# Patient Record
Sex: FEMALE | Race: WHITE | HISPANIC OR LATINO | Employment: FULL TIME | URBAN - METROPOLITAN AREA
[De-identification: names, ages, dates, MRNs, and addresses within clinical notes are randomized per-mention and may not be internally consistent; named-entity substitution may affect disease eponyms.]

---

## 2019-12-04 ENCOUNTER — HOSPITAL LABORATORY (OUTPATIENT)
Dept: OTHER | Facility: CLINIC | Age: 29
End: 2019-12-04

## 2019-12-05 ENCOUNTER — TELEPHONE (OUTPATIENT)
Dept: OTHER | Facility: CLINIC | Age: 29
End: 2019-12-05

## 2019-12-05 NOTE — TELEPHONE ENCOUNTER
Patient is scheduled with Dr. Gama for 12/6/19 at 12.  Yung has been informed that Dr. Gama will be coming from a surgery so the appointment time may be pushed back some.  She has the address, know about parking and walkway.     Fabby FRYE 12/5/19

## 2019-12-05 NOTE — TELEPHONE ENCOUNTER
Per Dr. Gama, he spoke with Dr. Olson regarding this patient and agreed to work this patient in on 12/6/19 at approximately noon.  No imaging needed per Dr. Gama.  Routing to scheduling to contact pt.  Please update pt Dr. Gama will be coming from surgery and time may need to change.    Zaida Hess, JEAN PAULN, RN  [facility] [place]

## 2019-12-06 ENCOUNTER — OFFICE VISIT (OUTPATIENT)
Dept: OTHER | Facility: CLINIC | Age: 29
End: 2019-12-06
Attending: SURGERY
Payer: COMMERCIAL

## 2019-12-06 VITALS — DIASTOLIC BLOOD PRESSURE: 72 MMHG | HEART RATE: 87 BPM | SYSTOLIC BLOOD PRESSURE: 117 MMHG

## 2019-12-06 DIAGNOSIS — L97.212 SKIN ULCER OF RIGHT CALF WITH FAT LAYER EXPOSED (H): Primary | ICD-10-CM

## 2019-12-06 PROCEDURE — G0463 HOSPITAL OUTPT CLINIC VISIT: HCPCS | Mod: 25

## 2019-12-06 PROCEDURE — 99203 OFFICE O/P NEW LOW 30 MIN: CPT | Mod: 25 | Performed by: SURGERY

## 2019-12-06 PROCEDURE — 11042 DBRDMT SUBQ TIS 1ST 20SQCM/<: CPT | Performed by: SURGERY

## 2019-12-06 NOTE — PROGRESS NOTES
[place] [facility]    Yung Dempsey was referred to see me today by Dr Long GROVES.  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  She required ORIF on 10/3/2019 by Dr. Olson.  There are incisions both on the medial lateral ankle and also over the distal right calf/ankle.  She had significant ecchymosis as documented in her photographs that she showed me today.  The other wounds healed fine and staples have all been removed.  Bone is doing very well and she is in a AFO off loading boot that they feel can be removed intermittently with full weightbearing.    Unfortunately, the site on the distal anterior calf/ankle was very ecchymotic she is developed a full-thickness ulceration.  Dry subcutaneous tissue is noted.  No evidence of significant undermining.  No evidence of infection.  She has some mild diffuse swelling for which she wears a Spandagrip.  Reports is somewhat more swollen today since she was working last evening.      Exam: Alert and appropriate.  Normal affect.             Blood pressure 117/72 right.  Pulse 87.             +3 palpable right dorsalis pedis pulse.              Posterior tibial pulses more difficult to palpate due to the swelling and                  surgical incision.  No evidence at all of ischemia to her foot.               The medial and lateral incisions have healed well.  Over the distal              Anterior calf/dorsal proximal ankle of the ulcerated area measures 4.5 x                       3.5 cm.         Procedure: Timeout was called and sites were identified.  Informed consent was obtained from the patient.  Area was prepped with ChloraPrep.  10 mL of 0.5% Marcaine were injected in a field block fashion with excellent results.  Using a #15 blade scalpel full-thickness/subcutaneous excisional debridement was performed on the entire wound.  I think excised all nonviable tissue.  Skin  edges were debrided to healthy bleeding tissue with no undermining.  Excised deeper tumor appeared to be the tissue is viable.  This is obviously reactive tissue with no granulation tissue noted.  The thrombosed of vein was excised.  I did not have to expose the underlying tendons which all appear to be intact.  Patient tolerated this procedure well.  We placed an Aquacel Ag moistened with saline over the wound followed by gauze and Kerlix roll and a Spandagrip.      Impression: Right distal calf/proximal anterior ankle ulceration following  ankle fracture.  Wound is been debrided today down to healthier tissue.  Still unclear whether that the wound needs much deeper debridement but clinically at this time it appears to be an adequate debridement without exposure of the tendons.  We will treat her initially with Aquacel Ag given her these dressings that she will moistened and changed daily for the next 2 days.  She may shower but should not soak her foot.  I think it is best that she keeps in the cam walker boot to decrease ankle motion.                She will return to see me in 3 days for reevaluation and possible debridement.  I discussed the options after this.  This has been ordered today to get approval from her insurance company I do feel that a wound VAC would be beneficial and with establishing good granulation tissue and healthy wound bed and hopefully prevent any further tissue loss.  But will take several days to be available.  Until that time she will use the Aquacel Ag that we have provided.  Once we do establish a good wound bed she will need to decide whether she wants to heal this by secondary intent or with a split-thickness skin graft.  This decision can be made at a later date.             We also did discuss the importance of nutrition for healing.  She will start taking ProteinPlus nutritional supplements that have 30 g of protein per container.  She needs to take of these 2 or 3 of these  daily to aid in healing.  Also multivitamin would be appropriate.              Fortunately she is young and healthy has excellent blood supply which should also aid in eventual good healing.       Jorge Gama MD     CC:  Dr Long Olson

## 2019-12-06 NOTE — NURSING NOTE
Yung Dempsey is a 29 year old female who presents for:  Chief Complaint   Patient presents with     Consult     New patient, wound on ankle. Referred by Dr. Olson. Per Natan no imaging needed.  Zaida/Natan ok'ed this day/time. Patient has been made aware that Natan will be in surgery, so time may be later.        Vitals:    Vitals:    12/06/19 1224 12/06/19 1225   BP: 111/75 117/72   BP Location: Left arm Right arm   Patient Position: Chair Chair   Cuff Size: Adult Regular Adult Regular   Pulse: 84 87       BMI:  There is no height or weight on file to calculate BMI.    Pain Score:  Data Unavailable        Maribel Rodriguez MA

## 2019-12-07 LAB
BACTERIA SPEC CULT: ABNORMAL
Lab: ABNORMAL
SPECIMEN SOURCE: ABNORMAL

## 2019-12-09 ENCOUNTER — TELEPHONE (OUTPATIENT)
Dept: OTHER | Facility: CLINIC | Age: 29
End: 2019-12-09

## 2019-12-09 ENCOUNTER — OFFICE VISIT (OUTPATIENT)
Dept: OTHER | Facility: CLINIC | Age: 29
End: 2019-12-09
Attending: SURGERY
Payer: COMMERCIAL

## 2019-12-09 VITALS — SYSTOLIC BLOOD PRESSURE: 116 MMHG | DIASTOLIC BLOOD PRESSURE: 80 MMHG | HEART RATE: 112 BPM

## 2019-12-09 DIAGNOSIS — L97.312: Primary | ICD-10-CM

## 2019-12-09 PROCEDURE — 99212 OFFICE O/P EST SF 10 MIN: CPT | Mod: 25 | Performed by: SURGERY

## 2019-12-09 PROCEDURE — G0463 HOSPITAL OUTPT CLINIC VISIT: HCPCS

## 2019-12-09 PROCEDURE — 11042 DBRDMT SUBQ TIS 1ST 20SQCM/<: CPT | Performed by: SURGERY

## 2019-12-09 NOTE — TELEPHONE ENCOUNTER
RN attempted to contact pt to discuss symptoms further. LVM requesting call back.  SAMAN Ibarra, RN  [facility] [place]    ADDENDUM:   Patient was evaluated by Dr. Gama in clinic on 12/9/19.

## 2019-12-09 NOTE — TELEPHONE ENCOUNTER
[facility]    Who is the name of the provider?:  Natan       What is the location you see this provider at?: [place]    Reason for call:  Has been ill since Saturday, leg is extremely painful, difficulty walking.  Should she keep the appt w Dr. Gama today at 4:45 pm    Can we leave a detailed message on this number?  YES

## 2019-12-09 NOTE — NURSING NOTE
Yung Dempsey is a 29 year old female who presents for:  Chief Complaint   Patient presents with     RECHECK     wound check, increased pain        Vitals:    Vitals:    12/09/19 1644   BP: 116/80   BP Location: Left arm   Patient Position: Chair   Cuff Size: Adult Regular   Pulse: 112       BMI:  There is no height or weight on file to calculate BMI.    Pain Score:  Data Unavailable        Gabbi Berger MA

## 2019-12-10 ENCOUNTER — TELEPHONE (OUTPATIENT)
Dept: OTHER | Facility: CLINIC | Age: 29
End: 2019-12-10

## 2019-12-10 NOTE — TELEPHONE ENCOUNTER
Pt called back. She reports her right ankle pain and swelling has significantly approved.  She was able to keep the leg elevated last night and did not go to work.  Pt plans to go to work tonight, she states she should be able to elevate her leg multiple times throughout the shift.  Wound vac is currently being ordered.  Will update Dr. Gama and determine when he would like pt to be seen again.  Zaida Hess, JEAN PAULN, RN  [facility] [place]

## 2019-12-10 NOTE — TELEPHONE ENCOUNTER
Contacted pt to follow up after 12/9/19 visit with Dr. Gama.  M requesting pt to call back at direct line, phone number provided.  Also provided clinic number to call back.  JEAN PAUL IbarraN, RN  [facility] [place]

## 2019-12-10 NOTE — PROGRESS NOTES
[place] [facility]    Yung Dempsey returns for follow-up.  She developed a full-thickness right dorsal ankle ulcer following ORIF.  We performed a debridement when she was in the office on 12/6/2019.  She has not been using Aquacel Ag with overlying gauze-Kerlix- Spandagrip.  She is still wearing her cast boot to limit her ankle activity.    She was up doing many activities over the weekend.  Noticed that their ankle is more swollen and sore last evening and again today.      Exam: Alert and appropriate.  Here with her daughter and father.              Cast boot was removed and dressings removed from the ankle               Ulcer measures 4 x 3 cm with a depth of 0.3 cm.                   Starting to see some granulation tissue on the lateral aspect.                   With the midportion to the medial aspect there is thick layer of slough and nonviable tissue.  Calf is swollen.  No erythema.      Procedure: Timeout was called and sites were identified.  Verbal consent from patient.  4% lidocaine applied with good results.  Using a #15 blade scalpel full-thickness/subcutaneous excisional debridement was performed.  On the lateral aspect remove the fibrin and slough with fairly good granulation tissue noted at the base.  On the more medial aspect we excised the nonviable tissue to the point where were starting to see viable tissue with adequate bleeding.  Tendons were not involved.  No joint is visualized.  No purulence at all.  No undermining.  Patient tolerated debridement well.  A moistened Aquacel Ag was placed over this followed by a gauze Stu and Ace wrap.  Placed back within her cast boot.    Impression: Some improvement of the ulcer.  No obvious infection.  Concerned why it is so sore but it may have been the swelling from her foot more dependent over the weekend.  She is scheduled to work at the [employer] this evening on the night shift.  I think with the present situation is best if she misses  today so she can go home and elevate her leg.  Obviously, we have the concerns of a deeper infection but I have no clinical findings that would indicate this.  We will thus continue with the Aquacel Ag, light wrap with Ace bandage, and leg elevation trying to limit her ambulation.  We will see her back in 2 to 3 days for reevaluation.  Likely will be a candidate for a wound VAC and we have applied for this.      Jorge Gama MD

## 2019-12-11 NOTE — TELEPHONE ENCOUNTER
[facility] wound vac rental authorization faxed to [phone] on 12/10/19, confirmed via RightFax at [phone].  Included patient demographics and recent progress notes.    Pt is scheduled to see Dr. Gama on 12/12/19 for application of wound vac.    Zaida Hess, JEAN PAULN, RN  [facility]

## 2019-12-11 NOTE — PROGRESS NOTES
[place] [facility]    Yung Dempsey returns for follow-up of her right distal anterior tibial/anterior ankle ulcer.  This occurred after ORIF of the significant right ankle fracture with subsequent good bony healing.  Debrided her callus last week and started Aquacel Ag dressing changes.  Saw in the office on 12/9/2019.  Wound was further debrided.  She had a lot of swelling and pain perhaps due to prolonged standing the day before with no evidence of deeper infection.  Has been changing the dressings daily and noticed improvement of the pain after missing her night shift at [employer] and elevating her leg.    Continues to do well.  Some mild soreness at the open ulcer with very minimal drainage using the Aquacel Ag.  She is using a leg scooter at work and still wearing the CAM Walker    Exam: Alert and appropriate.             Blood pressure 109/73.  Pulse 80             Removed Aquacel from right distal calf/ankle ulcer.              This measures 4 x 3 x 0.5 cm.                Approximately 30% with healthy granulation tissue.  Thicker layer of                      slough.  No undermining.  No erythema.  Deepest area has no exposed                Bone.    Procedure: Timeout was called and consents were signed.  4% topical lidocaine to the ulcer.  Using #15 blade scalpel full-thickness/subcutaneous excisional debridement was performed remove the slough and fibrin and debris the skin edges down to healthy tissue.  Crosshatching the deepest portion where we see the fascia type tissue.  Good bleeding was noted throughout.  Tolerated this well.          Sure prep was then not placed around the skin edges.  A grayish-black granular foam VAC sponge was applied to the ulcer and connected to the home VAC system 125 mmHg with a good seal.    Impression: Right distal calf/ankle ulcer following ankle fracture.  No evidence of deep tissue involvement.  Wound is been debrided today.  We have initiated the wound  VAC that she will leave in place.  We will see her again in 4 days for VAC change.       Jorge Gama MD

## 2019-12-12 ENCOUNTER — OFFICE VISIT (OUTPATIENT)
Dept: OTHER | Facility: CLINIC | Age: 29
End: 2019-12-12
Attending: SURGERY
Payer: COMMERCIAL

## 2019-12-12 VITALS — SYSTOLIC BLOOD PRESSURE: 109 MMHG | HEART RATE: 80 BPM | DIASTOLIC BLOOD PRESSURE: 73 MMHG

## 2019-12-12 DIAGNOSIS — L97.212 CALF ULCER, RIGHT, WITH FAT LAYER EXPOSED (H): Primary | ICD-10-CM

## 2019-12-12 PROCEDURE — G0463 HOSPITAL OUTPT CLINIC VISIT: HCPCS

## 2019-12-12 PROCEDURE — 11042 DBRDMT SUBQ TIS 1ST 20SQCM/<: CPT | Performed by: SURGERY

## 2019-12-12 PROCEDURE — 97605 NEG PRS WND THER DME<=50SQCM: CPT | Performed by: SURGERY

## 2019-12-12 RX ORDER — ACETAMINOPHEN 500 MG
TABLET ORAL
COMMUNITY

## 2019-12-12 RX ORDER — IBUPROFEN 200 MG
200 TABLET ORAL EVERY 4 HOURS PRN
COMMUNITY

## 2019-12-12 NOTE — NURSING NOTE
Yung Dempsey is a 29 year old female who presents for:  Chief Complaint   Patient presents with     RECHECK     apply wound vac right ankle; wound vac ordered per CHET on 12/10/19        Vitals:    Vitals:    12/12/19 1155   BP: 109/73   BP Location: Left arm   Patient Position: Chair   Cuff Size: Adult Regular   Pulse: 80       BMI:  There is no height or weight on file to calculate BMI.    Pain Score:  Data Unavailable        Maribel Rodriguez MA

## 2019-12-16 ENCOUNTER — HOSPITAL ENCOUNTER (OUTPATIENT)
Dept: WOUND CARE | Facility: CLINIC | Age: 29
Discharge: HOME | End: 2019-12-16
Attending: SURGERY | Admitting: SURGERY
Payer: COMMERCIAL

## 2019-12-16 VITALS
WEIGHT: 154.6 LBS | HEIGHT: 64 IN | TEMPERATURE: 97.3 F | DIASTOLIC BLOOD PRESSURE: 70 MMHG | HEART RATE: 73 BPM | RESPIRATION RATE: 16 BRPM | BODY MASS INDEX: 26.4 KG/M2 | SYSTOLIC BLOOD PRESSURE: 109 MMHG

## 2019-12-16 DIAGNOSIS — S81.001A OPEN WOUND OF KNEE, LEG, AND ANKLE, RIGHT, INITIAL ENCOUNTER: ICD-10-CM

## 2019-12-16 DIAGNOSIS — S91.001A ANKLE WOUND, RIGHT, INITIAL ENCOUNTER: ICD-10-CM

## 2019-12-16 DIAGNOSIS — S81.801A OPEN WOUND OF KNEE, LEG, AND ANKLE, RIGHT, INITIAL ENCOUNTER: ICD-10-CM

## 2019-12-16 DIAGNOSIS — S91.001A OPEN WOUND OF KNEE, LEG, AND ANKLE, RIGHT, INITIAL ENCOUNTER: ICD-10-CM

## 2019-12-16 PROCEDURE — 11042 DBRDMT SUBQ TIS 1ST 20SQCM/<: CPT | Performed by: SURGERY

## 2019-12-16 PROCEDURE — 11042 DBRDMT SUBQ TIS 1ST 20SQCM/<: CPT

## 2019-12-16 PROCEDURE — G0463 HOSPITAL OUTPT CLINIC VISIT: HCPCS | Mod: 25

## 2019-12-16 ASSESSMENT — MIFFLIN-ST. JEOR: SCORE: 1401.26

## 2019-12-16 NOTE — PROGRESS NOTES
[facility] [place] Progress Note    Subject: Yung Dempsey comes to see me at the wound clinic for ongoing treatment of her right distal anterior calf/ankle ulcer.  This was associated with her significant ankle fracture with wound breakdown.  I saw her at the vascular office which is documented in our EPIC notes.  Debridement was performed there was initially started on Aquasol AG.  We initiated a wound VAC on 12/12/2019.  This is been working well.    She is still offloading using a cam walker and a knee roller.  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    She has had some chronic swelling in her foot associated with the initial fracture and she is using an Ace bandage for this.    PMH: No past medical history on file.  There is no problem list on file for this patient.    Social Hx:   Social History     Socioeconomic History     Marital status: Single     Spouse name: Not on file     Number of children: Not on file     Years of education: Not on file     Highest education level: Not on file   Occupational History     Not on file   Social Needs     Financial resource strain: Not on file     Food insecurity:     Worry: Not on file     Inability: Not on file     Transportation needs:     Medical: Not on file     Non-medical: Not on file   Tobacco Use     Smoking status: Never Smoker     Smokeless tobacco: Never Used   Substance and Sexual Activity     Alcohol use: Yes     Drug use: Never     Sexual activity: Not on file   Lifestyle     Physical activity:     Days per week: Not on file     Minutes per session: Not on file     Stress: Not on file   Relationships     Social connections:     Talks on phone: Not on file     Gets together: Not on file     Attends [employer] service: Not on file     Active member of club or organization: Not on file     Attends meetings of clubs or organizations: Not on file     Relationship status: Not on file     Intimate partner violence:  "    Fear of current or ex partner: Not on file     Emotionally abused: Not on file     Physically abused: Not on file     Forced sexual activity: Not on file   Other Topics Concern     Not on file   Social History Narrative     Not on file       Surgical Hx: No past surgical history on file.    Allergies:  No Known Allergies    Medications:   Current Outpatient Medications   Medication     acetaminophen (TYLENOL) 500 MG tablet     ibuprofen (ADVIL/MOTRIN) 200 MG tablet     levonorgestrel (MIRENA) 20 MCG/24HR IUD     No current facility-administered medications for this encounter.        Labs: No results for input(s): ALBUMIN, HGB, INR, WBC, A1C, CRP in the last 82237 hours.    Invalid input(s): PREALBUMIN,  GLUCOSE, MICROBIO  No results found for: CR  No results found for: GFRESTIMATED  No results found for: GFRESTBLACK  No results found for: WBC  No results found for: CR     Nutrition requirements were discussed with patient today.  Objective:  /70 (BP Location: Right arm)   Pulse 73   Temp 97.3  F (36.3  C) (Temporal)   Resp 16   Ht 1.626 m (5' 4\")   Wt 70.1 kg (154 lb 9.6 oz)   BMI 26.54 kg/m    Wound (used by OP [facility] only) 12/16/19 1156 Right anterior ankle (Active)   Length (cm) 3 12/16/2019 11:00 AM   Width (cm) 4 12/16/2019 11:00 AM   Depth (cm) 0.3 12/16/2019 11:00 AM   Wound (cm^2) 12 cm^2 12/16/2019 11:00 AM   Wound Volume (cm^3) 3.6 cm^3 12/16/2019 11:00 AM   Dressing Appearance intact 12/16/2019 11:00 AM   Drainage Characteristics/Odor serosanguineous 12/16/2019 11:00 AM   Drainage Amount moderate 12/16/2019 11:00 AM        General:  Patient is alert and orientated, no acute distress.   Wound VAC was removed.  Mild ankle edema is noted.  No cellulitis.  Ulcerated area measures 3 x 4 x 0.3 cm.  70% of this is excellent granulation tissue with a mild amount of slough.  In the center of the wound that is deeper there is some nonviable tissue still present that I debrided with her last visit.  No " undermining.    Vascular: +3 palpable pedal pulses.    Procedure:   Patient was determined to be capable of making their own medical decisions and informed consent was obtained, topical anesthetic of 4% lidocaine was applied, debridement was performed.  All slough and fibrin removed.  Excised the area as described above down to healthy tissue.  Does not involve the deeper tissue.  No evidence of abscess or tunneling.  Good bleeding throughout.  Was used to debride ulcer down to and including subcutaneous tissue. Bleeding controlled with light pressure. Patient tolerated procedure well.    Impression: Traumatic right distal calf/ankle ulceration.  Wound is been debrided.  Overall improved wound with good granulation tissue except in the center area involves approximately 30%.  We will reapply the negative pressure dressing here in the clinic.  This will be changed twice weekly.  We will plan surgical debridement here in the clinic weekly by myself.    Plan:  We will dress the wounds with wound VAC at 125 mmHg.  Patient will return to the clinic in 1 weeks time to see me.  Later this week for VAC change.    Jorge Gama MD on 12/16/2019 at 12:10 PM

## 2019-12-16 NOTE — PROGRESS NOTES
Patient arrived for wound care visit. Certified Wound Care Nurse time spent evaluating patient record, completed a full evaluation and documented wound(s) & moe-wound skin; provided recommendation based on treatment plan. Applied dressing, reviewed discharge instructions, patient education, and discussed plan of care with appropriate medical team staff members and patient and/or family members.

## 2019-12-16 NOTE — DISCHARGE INSTRUCTIONS
[facility]  [address], [place] [address]  Appointment Phone [phone]    Yung Harmona      1990    Wound Dressing Change to right anterior ankle  Cleanse inside wound with saline or wound cleanser  Skin Care: Use Skin Prep to moe-wound skin  NPWT using black foam and pressure set to 125mmHg continuous suction.  Change dressing twice a week     Jorge Gama M.D. December 16, 2019    Call us at [phone] if you have any questions about your wounds, have redness or swelling around your wound, have a fever of 101 or greater or if you have any other problems or concerns. We answer the phone Monday through Friday 8 am to 4 pm, please leave a message as we check the voicemail frequently throughout the day.     Follow up with our office as scheduled

## 2019-12-20 ENCOUNTER — HOSPITAL ENCOUNTER (OUTPATIENT)
Dept: WOUND CARE | Facility: CLINIC | Age: 29
Discharge: HOME | End: 2019-12-20
Attending: SURGERY | Admitting: SURGERY
Payer: COMMERCIAL

## 2019-12-20 VITALS
TEMPERATURE: 98.1 F | DIASTOLIC BLOOD PRESSURE: 82 MMHG | SYSTOLIC BLOOD PRESSURE: 124 MMHG | HEART RATE: 81 BPM | RESPIRATION RATE: 16 BRPM

## 2019-12-20 DIAGNOSIS — S91.001A ANKLE WOUND, RIGHT, INITIAL ENCOUNTER: ICD-10-CM

## 2019-12-20 PROCEDURE — A6021 COLLAGEN DRESSING <=16 SQ IN: HCPCS

## 2019-12-20 PROCEDURE — A6454 SELF-ADHER BAND W>=3" <5"/YD: HCPCS

## 2019-12-20 PROCEDURE — A6441 PAD BAND W>=3" <5"/YD: HCPCS

## 2019-12-20 PROCEDURE — 97597 DBRDMT OPN WND 1ST 20 CM/<: CPT

## 2019-12-20 NOTE — PROGRESS NOTES
[facility] [place] Nurse Note    Subject: Yung Dempsey presents for nurse only visit for wound vac dressing change. Right Dorsal foot swollen and exposed tendon on medial aspect of wound.       Exam:  /82 (BP Location: Left arm)   Pulse 81   Temp 98.1  F (36.7  C) (Temporal)   Resp 16   Wound (used by OP [facility] only) 12/16/19 1156 Right anterior ankle surgical (Active)   Length (cm) 2.8 12/20/2019 11:50 AM   Width (cm) 4.4 12/20/2019 11:50 AM   Depth (cm) 0.7 12/20/2019 11:50 AM   Wound (cm^2) 12.32 cm^2 12/20/2019 11:50 AM   Wound Volume (cm^3) 8.62 cm^3 12/20/2019 11:50 AM   Wound healing % -2.67 12/20/2019 11:50 AM   Dressing Appearance intact 12/20/2019 11:50 AM   Drainage Characteristics/Odor serosanguineous 12/20/2019 11:50 AM   Drainage Amount moderate 12/20/2019 11:50 AM   Thickness/Stage full thickness 12/20/2019 11:50 AM   Base red/granulating;exposed;tendon 12/20/2019 11:50 AM   Red (%), Wound Tissue Color 70 12/20/2019 11:50 AM   Yellow (%), Wound Tissue Color 30 12/20/2019 11:50 AM   Periwound swelling 12/20/2019 11:50 AM   Periwound Temperature warm 12/20/2019 11:50 AM   Periwound Skin Turgor soft 12/20/2019 11:50 AM   Edges open 12/20/2019 11:50 AM   Care, Wound debrided 12/20/2019 11:50 AM     Procedure:  Time out was called, informed consent obtained, and topical anesthetic of 4% lidocaine was applied,selective debridement was performed using a curette to remove non-viable tissue less than 20 sq cm, no bleeding occurred. Patient tolerated procedure well.  Procedure:Wound was redressed with endoform NPWT @ 125mmhg with good seal and no leaks. Due to swelling 2 layer coflex wrap applied with zinc than CAM boot     Plan: Patient will return to the clinic in Monday  Meredith Sigala, JEAN PAULN, RN, CWOCN  December 20, 2019

## 2019-12-23 ENCOUNTER — HOSPITAL ENCOUNTER (OUTPATIENT)
Dept: WOUND CARE | Facility: CLINIC | Age: 29
Discharge: HOME | End: 2019-12-23
Attending: SURGERY | Admitting: SURGERY
Payer: COMMERCIAL

## 2019-12-23 VITALS
SYSTOLIC BLOOD PRESSURE: 112 MMHG | DIASTOLIC BLOOD PRESSURE: 71 MMHG | TEMPERATURE: 97.7 F | HEART RATE: 76 BPM | RESPIRATION RATE: 17 BRPM

## 2019-12-23 DIAGNOSIS — S91.001A ANKLE WOUND, RIGHT, INITIAL ENCOUNTER: ICD-10-CM

## 2019-12-23 PROCEDURE — A6441 PAD BAND W>=3" <5"/YD: HCPCS

## 2019-12-23 PROCEDURE — 11042 DBRDMT SUBQ TIS 1ST 20SQCM/<: CPT | Performed by: SURGERY

## 2019-12-23 PROCEDURE — A6454 SELF-ADHER BAND W>=3" <5"/YD: HCPCS

## 2019-12-23 PROCEDURE — 11042 DBRDMT SUBQ TIS 1ST 20SQCM/<: CPT

## 2019-12-23 NOTE — PROGRESS NOTES
[facility] Wound Healing [place] Progress Note    Subject: Yung Dempsey returns for wound debridement and VAC change.  She had an ulceration over her surgical site with the skin broke down after ankle fracture.  No obvious deep involvement.  However, there has been a deeper area that goes close to the fascia in the center of the wound that we have been concerned about.    Last visit they placed endoform over this area under the VAC.  Also dynamic compression wrap but due to the swelling which is worked out very well for her.  She has no specific weightbearing restrictions from her orthopedic standpoint.  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  She is also using her knee scooter to offload quite frequently.    Has been taking her nutritional supplements to aid in healing.  Very little discomfort from the site.  Very little discharge in the VAC containers.    Patient Active Problem List   Diagnosis     Ankle wound, right, initial encounter     No past medical history on file.  Exam:  /71 (BP Location: Right arm)   Pulse 76   Temp 97.7  F (36.5  C) (Temporal)   Resp 17   Wound (used by OP [facility] only) 12/16/19 1156 Right anterior ankle surgical (Active)   Length (cm) 2.7 12/23/2019 11:00 AM   Width (cm) 3.5 12/23/2019 11:00 AM   Depth (cm) 0.4 12/23/2019 11:00 AM   Wound (cm^2) 9.45 cm^2 12/23/2019 11:00 AM   Wound Volume (cm^3) 3.78 cm^3 12/23/2019 11:00 AM   Wound healing % 21.25 12/23/2019 11:00 AM   Dressing Appearance intact 12/23/2019 11:00 AM   Drainage Characteristics/Odor serosanguineous 12/23/2019 11:00 AM   Drainage Amount moderate 12/23/2019 11:00 AM   Thickness/Stage full thickness 12/23/2019 12:15 PM   Base red/granulating;slough 12/23/2019 12:15 PM   Periwound intact;edematous 12/23/2019 12:15 PM   Periwound Temperature warm 12/23/2019 12:15 PM   Care, Wound debrided 12/23/2019 12:15 PM     Alert and appropriate.  Very comfortable.  Dressings removed.   Swelling is better.  No erythema or cellulitis.  Ulcerated area measures 2.7 x 3.5 x 0.4 cm.  Worsening good granulation tissue on the borders.  Still some nonviable tissue at the center area as noted in the photograph where it is whitish in color.  This does not go deeper than the fascia.    Procedure:   Patient was determined to be capable of making their own medical decisions and informed consent was obtained. Topical anesthetic of 4% lidocaine was applied, debridement was performed using a #15 blade down to and including subcutaneous tissue.  Did excise extensively the center area down to healthy fascia.  All slough and fibrin removed on the edges where we do have good healthy granulation tissue.  No undermining.  Tolerated this with very minimal if any discomfort with the topical lidocaine.  Bleeding controlled with light pressure. Patient tolerated procedure well.    Impression: Overall improvement.  We will continue with wound VAC.  Hold the endoform.  We will cut a separate sponge to fill the deeper area to make sure we have excellent contact and thus used to sections of sponge.  Home VAC and 125 mmHg.  Dynamic compression wrap was helpful for the edema worked out very well and thus we will reapply this today also.    She does have a cam boot and should wear this at work but can use this less and less at home.    Plan: We will dress the wounds with wound VAC and dynamic compression wrap.  This will be changed 12/27/2019.  I will see her again next week for debridement..  Patient will return to the clinic in 1 weeks time    Jorge Gama MD on 12/23/2019 at 12:18 PM

## 2019-12-23 NOTE — DISCHARGE INSTRUCTIONS
[facility]  [address], [facility] [address]  Appointment Phone [phone]    Yung Harmona 1990    Wound Dressing Change to right anterior ankle  Cleanse inside wound with saline or wound cleanser  Skin Care: Use Skin Prep to moe-wound skin  NPWT using black foam (make sure to get into divit) and pressure set to 125mmHg continuous suction followed by a zinc colfex  Change dressing twice a week    Jorge Gama M.D. December 23, 2019    Call us at [phone] if you have any questions about your wounds, have redness or  swelling around your wound, have a fever of 101 or greater or if you have any other  problems or concerns. We answer the phone Monday through Friday 8 am to 4 pm,  please leave a message as we check the voicemail frequently throughout the day.    Follow up with our office as scheduled

## 2019-12-27 ENCOUNTER — HOSPITAL ENCOUNTER (OUTPATIENT)
Dept: WOUND CARE | Facility: CLINIC | Age: 29
Discharge: HOME | End: 2019-12-27
Attending: SURGERY | Admitting: SURGERY
Payer: COMMERCIAL

## 2019-12-27 DIAGNOSIS — S91.001A ANKLE WOUND, RIGHT, INITIAL ENCOUNTER: ICD-10-CM

## 2019-12-27 PROCEDURE — A6441 PAD BAND W>=3" <5"/YD: HCPCS

## 2019-12-27 PROCEDURE — 29581 APPL MULTLAYER CMPRN SYS LEG: CPT

## 2019-12-27 PROCEDURE — A6454 SELF-ADHER BAND W>=3" <5"/YD: HCPCS

## 2019-12-27 PROCEDURE — 97605 NEG PRS WND THER DME<=50SQCM: CPT

## 2019-12-27 NOTE — PROGRESS NOTES
[facility] Wound Healing [place] Nurse Note    Subject: Yung Dempsey presents for nurse only visit for wound vac dressing change. Her 2 layer wrap that was applied earlier this week got wet with a shower so she took it off and applied spandigrip and ace wrap      Exam:    Wound (used by OP [facility] only) 12/16/19 1156 Right anterior ankle surgical (Active)   Length (cm) 2 12/27/2019  8:17 AM   Width (cm) 3.5 12/27/2019  8:17 AM   Depth (cm) 0.6 12/27/2019  8:17 AM   Wound (cm^2) 7 cm^2 12/27/2019  8:17 AM   Wound Volume (cm^3) 4.2 cm^3 12/27/2019  8:17 AM   Wound healing % 41.67 12/27/2019  8:17 AM   Dressing Appearance moist drainage 12/27/2019  8:17 AM   Drainage Characteristics/Odor yellow 12/27/2019  8:17 AM   Drainage Amount moderate 12/27/2019  8:17 AM   Thickness/Stage full thickness 12/27/2019  8:17 AM   Base red/granulating 12/27/2019  8:17 AM   Red (%), Wound Tissue Color 100 12/27/2019  8:17 AM   Periwound intact 12/27/2019  8:17 AM   Periwound Temperature warm 12/27/2019  8:17 AM   Periwound Skin Turgor soft 12/27/2019  8:17 AM   Edges open 12/27/2019  8:17 AM   Care, Wound non-select wound debridement performed;negative pressure wound therapy 12/27/2019  8:17 AM     Procedure:  Topical anesthetic of 4% lidocaine was applied,non-selective debridement was performed, no bleeding occurred. Patient tolerated procedure well.  Procedure #2:  Wound was redressed with  NPWT @ 125mmhg with good seal and no leaks.  Procedure #3: Application of 2 layer coflex wrap was applied.  Plan: Patient will return to the clinic Monday.  Meredith Sigala, JEAN PAULN, RN, CWOCN  December 27, 2019

## 2019-12-30 ENCOUNTER — HOSPITAL ENCOUNTER (OUTPATIENT)
Dept: WOUND CARE | Facility: CLINIC | Age: 29
Discharge: HOME | End: 2019-12-30
Attending: SURGERY | Admitting: SURGERY
Payer: COMMERCIAL

## 2019-12-30 VITALS
TEMPERATURE: 97.8 F | RESPIRATION RATE: 16 BRPM | SYSTOLIC BLOOD PRESSURE: 123 MMHG | HEART RATE: 68 BPM | DIASTOLIC BLOOD PRESSURE: 68 MMHG

## 2019-12-30 DIAGNOSIS — S91.001A ANKLE WOUND, RIGHT, INITIAL ENCOUNTER: ICD-10-CM

## 2019-12-30 PROCEDURE — A6454 SELF-ADHER BAND W>=3" <5"/YD: HCPCS

## 2019-12-30 PROCEDURE — 11042 DBRDMT SUBQ TIS 1ST 20SQCM/<: CPT | Performed by: SURGERY

## 2019-12-30 PROCEDURE — 11042 DBRDMT SUBQ TIS 1ST 20SQCM/<: CPT

## 2019-12-30 PROCEDURE — A6441 PAD BAND W>=3" <5"/YD: HCPCS

## 2019-12-30 NOTE — PROGRESS NOTES
[facility] Wound Healing [place] Progress Note    Subject: Yung Dempsey returns for follow-up of her right anterior distal calf/ankle ulcer associated with her ORIF of her ankle fracture.  We have been debriding the ulcer on a weekly basis and using a negative pressure wound VAC.  Over the last 2 weeks dynamic compression wrap is been used to control the edema and this is been very helpful.    This is been working out very well for her.  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    Patient Active Problem List   Diagnosis     Ankle wound, right, initial encounter     No past medical history on file.  Exam:  /68 (BP Location: Left arm)   Pulse 68   Temp 97.8  F (36.6  C) (Temporal)   Resp 16   Wound (used by OP [facility] only) 12/16/19 1156 Right anterior ankle surgical (Active)   Length (cm) 2.6 12/30/2019 11:05 AM   Width (cm) 3.2 12/30/2019 11:05 AM   Depth (cm) 0.6 12/30/2019 11:05 AM   Wound (cm^2) 8.32 cm^2 12/30/2019 11:05 AM   Wound Volume (cm^3) 4.99 cm^3 12/30/2019 11:05 AM   Wound healing % 30.67 12/30/2019 11:05 AM   Dressing Appearance intact 12/30/2019 11:05 AM   Drainage Characteristics/Odor serosanguineous 12/30/2019 11:05 AM   Drainage Amount moderate 12/30/2019 11:05 AM     Alert and appropriate.  Here with her daughter.  Very comfortable.  VAC was removed.  Ulceration continues to improve.  The central area that was quite deep with poor granulation tissue was significantly better.  Mild amount of slough is noted and slightly thicker at this deeper segment in the center.  No undermining.  No cellulitis.  No edema.    Procedure:   Patient was determined to be capable of making their own medical decisions and informed consent was obtained. Topical anesthetic of 4% lidocaine was applied, debridement was performed using a #15 blade down to and including subcutaneous tissue.  All slough and fibrin removed.  Slightly deeper debridement in  the central area but healthy granulation tissue now fills the entire wound bed at with good vascularity.  Edges slightly debrided also on the skin.  On the edges the granulation tissue is flush with the epithelium.  Bleeding controlled with light pressure. Patient tolerated procedure well.    Impression: Continued improvement.  Will continue with the black granular foam sponge directly over the ulcer bed with a wound VAC in 125 mmHg.  Dynamic compression wrap was applied from the toes to the mid calf to help control the swelling which is quite effective.    Plan: We will dress the wounds with wound VAC and compression wrap.      Patient will return to the clinic this Friday for dressing change.  I will see her again in 1 week for reevaluation.  We will consider discontinuation of the VAC at that time depending the state of the ulceration and this is been discussed with the patient.    Post debridement photo below          Jorge Gama MD on 12/30/2019 at 11:22 AM

## 2019-12-30 NOTE — DISCHARGE INSTRUCTIONS
[facility] WOUND HEALING INSTITUTE  [address], [facility] [address]  Appointment Phone [phone]    Yung Harmona 1990    Wound Dressing Change to right anterior ankle  Cleanse inside wound with saline or wound cleanser  Skin Care: Use Skin Prep to moe-wound skin  NPWT using black foam (make sure to get into divit) and pressure set to 125mmHg  continuous suction followed by a zinc colfex  Change dressing twice a week    Jorge Gama M.D. December 30, 2019    Call us at [phone] if you have any questions about your wounds, have redness or  swelling around your wound, have a fever of 101 or greater or if you have any other  problems or concerns. We answer the phone Monday through Friday 8 am to 4 pm,  please leave a message as we check the voicemail frequently throughout the day.    Follow up with our office as scheduled

## 2020-01-03 ENCOUNTER — HOSPITAL ENCOUNTER (OUTPATIENT)
Dept: WOUND CARE | Facility: CLINIC | Age: 30
Discharge: HOME | End: 2020-01-03
Attending: SURGERY | Admitting: SURGERY
Payer: COMMERCIAL

## 2020-01-03 VITALS
TEMPERATURE: 97.7 F | DIASTOLIC BLOOD PRESSURE: 78 MMHG | RESPIRATION RATE: 18 BRPM | HEART RATE: 80 BPM | SYSTOLIC BLOOD PRESSURE: 116 MMHG

## 2020-01-03 DIAGNOSIS — S91.001A ANKLE WOUND, RIGHT, INITIAL ENCOUNTER: ICD-10-CM

## 2020-01-03 PROCEDURE — A6454 SELF-ADHER BAND W>=3" <5"/YD: HCPCS

## 2020-01-03 PROCEDURE — 97605 NEG PRS WND THER DME<=50SQCM: CPT

## 2020-01-03 PROCEDURE — A6441 PAD BAND W>=3" <5"/YD: HCPCS

## 2020-01-03 PROCEDURE — 97597 DBRDMT OPN WND 1ST 20 CM/<: CPT

## 2020-01-03 NOTE — PROGRESS NOTES
[facility] Wound Healing [place] Nurse Note    Subject: Yung Dempsey presents for nurse only visit for wound vac dressing change. To right anterior lower shin surgical site.     Exam:  /78 (BP Location: Left arm)   Pulse 80   Temp 97.7  F (36.5  C) (Temporal)   Resp 18   Wound (used by OP [facility] only) 12/16/19 1156 Right anterior ankle surgical (Active)   Length (cm) 2.5 1/3/2020  2:00 PM   Width (cm) 4.7 1/3/2020  2:00 PM   Depth (cm) 0.5 1/3/2020  2:00 PM   Wound (cm^2) 11.75 cm^2 1/3/2020  2:00 PM   Wound Volume (cm^3) 5.88 cm^3 1/3/2020  2:00 PM   Wound healing % 2.08 1/3/2020  2:00 PM   Dressing Appearance intact 1/3/2020  2:00 PM   Drainage Characteristics/Odor serosanguineous 1/3/2020  2:00 PM   Drainage Amount moderate 1/3/2020  2:00 PM   Thickness/Stage full thickness 1/3/2020  2:00 PM   Red (%), Wound Tissue Color 100 1/3/2020  2:00 PM   Periwound edematous;macerated 1/3/2020  2:00 PM   Periwound Temperature warm 1/3/2020  2:00 PM   Periwound Skin Turgor soft 1/3/2020  2:00 PM   Edges open 1/3/2020  2:00 PM   Care, Wound debrided;negative pressure wound therapy 1/3/2020  2:00 PM     Procedure:  Time out was called, informed consent obtained, and topical anesthetic of 4% lidocaine was applied,selective debridement was performed using a curette to remove non-viable tissue less than 20 sq cm, no bleeding occurred. Patient tolerated procedure well.  Procedure #2:  Wound was redressed with NWPT @ 125mmhg with good seal and no leaks. The periwound was treated with miconazole powder and skin prep and also hydrocolloid. Landing pad was bridged to the inner lower leg on the soft part of the leg.   Procedure #3: Application of 2 layer coflex wrap was applied.  Plan: Patient will return to the clinic on Monday.    January 3, 2020

## 2020-01-06 ENCOUNTER — HOSPITAL ENCOUNTER (OUTPATIENT)
Dept: WOUND CARE | Facility: CLINIC | Age: 30
Discharge: HOME | End: 2020-01-06
Attending: SURGERY | Admitting: SURGERY
Payer: COMMERCIAL

## 2020-01-06 VITALS
DIASTOLIC BLOOD PRESSURE: 71 MMHG | SYSTOLIC BLOOD PRESSURE: 120 MMHG | TEMPERATURE: 97.4 F | HEART RATE: 75 BPM | RESPIRATION RATE: 16 BRPM

## 2020-01-06 DIAGNOSIS — S91.001A ANKLE WOUND, RIGHT, INITIAL ENCOUNTER: ICD-10-CM

## 2020-01-06 PROCEDURE — 11042 DBRDMT SUBQ TIS 1ST 20SQCM/<: CPT | Performed by: SURGERY

## 2020-01-06 PROCEDURE — A6212 FOAM DRG <=16 SQ IN W/BORDER: HCPCS

## 2020-01-06 PROCEDURE — A6021 COLLAGEN DRESSING <=16 SQ IN: HCPCS

## 2020-01-06 PROCEDURE — 11042 DBRDMT SUBQ TIS 1ST 20SQCM/<: CPT

## 2020-01-06 NOTE — DISCHARGE INSTRUCTIONS
[facility] WOUND HEALING INSTITUTE  [address] [place] [id number] [address]    Call us at [phone] if you have any questions about your wounds, have redness or swelling around your wound, have a fever of 101 or greater or if you have any other problems or concerns. We answer the phone Monday through Friday 8 am to 4 pm, please leave a message as we check the voicemail frequently throughout the day.     Yung Dempsey      1990    Collagen Wound Dressing Change to right anterior ankle  Cleanse wound with soap and water in the shower  Cover wound with Endoform cut to size of wound (this will dissolve into the wound)  Cover wound with 4x4 Mepilex border  Change dressing every other day    Compression:  Apply clean compression Spandagrip E first thing in the morning and remove at night before going to bed.   Remove compression dressing if toes turn blue and/or start to feel numb and is not relieved by elevating the leg for one hour.      Jorge Gama M.D.. January 6, 2020    Wound Healing Clinic  Follow up with Dr. Gama in 2 weeks  [phone]

## 2020-01-06 NOTE — PROGRESS NOTES
[facility] Wound Healing [place] Progress Note    Subject: Yung Dempsey returns for follow-up of her right distal calf/anterior ankle ulcer.  This occurred after her fracture which is subsequently healed.  We have been using a negative pressure wound VAC and changing this twice weekly.  She is working at [employer] as an RN.  Has been limiting her ankle movement with her cast boot but there is no restrictions from her orthopedic surgeons for full weightbearing.  Very minimal output from the wound VAC.      Patient Active Problem List   Diagnosis     Ankle wound, right, initial encounter     No past medical history on file.  Exam:  /71 (BP Location: Left arm)   Pulse 75   Temp 97.4  F (36.3  C)   Resp 16   Wound (used by OP [facility] only) 12/16/19 1156 Right anterior ankle surgical (Active)   Length (cm) 2.5 1/6/2020  7:53 AM   Width (cm) 4 1/6/2020  7:53 AM   Depth (cm) 0.2 1/6/2020  7:53 AM   Wound (cm^2) 10 cm^2 1/6/2020  7:53 AM   Wound Volume (cm^3) 2 cm^3 1/6/2020  7:53 AM   Wound healing % 16.67 1/6/2020  7:53 AM   Dressing Appearance intact 1/6/2020  7:53 AM   Drainage Characteristics/Odor sanguineous 1/6/2020  7:53 AM   Drainage Amount moderate 1/6/2020  7:53 AM     Alert and appropriate.  Very comfortable.  Here with her young daughter.  Some irritated skin on the medial aspect from the wound VAC adhesive dressing.  No cellulitis.  Very minimal swelling since we have been using a compression wrap over the wound VAC.    Excellent granulation tissue is noted.  Ulcer measures 2.5 x 4 x 0.2 cm.  Mild amount of slough and fibrin is noted.  No undermining.  The very central area which had poor granulation tissue now looks quite good.    Procedure:   Patient was determined to be capable of making their own medical decisions and informed consent was obtained. Topical anesthetic of 4% lidocaine was applied, debridement was performed using a #15 blade down to and including subcutaneous tissue.  All slough and  fibrin removed.  Skin edges slightly debrided.  Excellent bleeding tissue throughout.  No undermining.  Very healthy granulation tissue filling the entire ulcer bed.  Bleeding controlled with light pressure. Patient tolerated procedure well.    Impression: Continued improvement.  Will discontinue wound VAC.  Will use endoform followed by a Mepilex border that may be changed every other day.  She may shower between dressing changes but no soaking.  Continue with her nutritional supplements.      Plan: We will dress the wounds with endoform-Mepilex border.  Spandagrip for compression.  To change every other day.  Patient will return to the clinic in 2 weeks time    Jorge Gama MD on 1/6/2020 at 8:02 AM

## 2020-01-06 NOTE — ADDENDUM NOTE
Encounter addended by: Fiorella Echeverria RN on: 1/6/2020 8:24 AM   Actions taken: Clinical Note Signed

## 2020-01-13 ENCOUNTER — HOSPITAL ENCOUNTER (OUTPATIENT)
Dept: WOUND CARE | Facility: CLINIC | Age: 30
Discharge: HOME | End: 2020-01-13
Attending: SURGERY | Admitting: SURGERY
Payer: COMMERCIAL

## 2020-01-13 VITALS
TEMPERATURE: 97.6 F | DIASTOLIC BLOOD PRESSURE: 71 MMHG | SYSTOLIC BLOOD PRESSURE: 118 MMHG | RESPIRATION RATE: 18 BRPM | HEART RATE: 77 BPM

## 2020-01-13 DIAGNOSIS — S91.001A ANKLE WOUND, RIGHT, INITIAL ENCOUNTER: ICD-10-CM

## 2020-01-13 PROCEDURE — 11042 DBRDMT SUBQ TIS 1ST 20SQCM/<: CPT | Performed by: SURGERY

## 2020-01-13 PROCEDURE — A6021 COLLAGEN DRESSING <=16 SQ IN: HCPCS

## 2020-01-13 PROCEDURE — 11042 DBRDMT SUBQ TIS 1ST 20SQCM/<: CPT

## 2020-01-13 NOTE — PROGRESS NOTES
[facility] Wound Healing [place] Progress Note    Subject: Yung Dempsey returns for follow-up of her right ankle ulcer.  She has been using the endoform and change this every other day with a Mepilex foam.  The adhesive of the Mepilex foam is irritating the surrounding skin which was already irritated from the wound VAC that we discontinue last week.  She is still wearing a Ortho walking boot when out of the house and when she is at work at [employer].  This helps protect her foot though there is no weightbearing restrictions per orthopedic surgeon following the ankle fracture repair.  Alert and appropriate.  Very comfortable.    Patient Active Problem List   Diagnosis     Ankle wound, right, initial encounter     No past medical history on file.  Exam:  /71 (BP Location: Left arm)   Pulse 77   Temp 97.6  F (36.4  C) (Temporal)   Resp 18   Wound (used by OP [facility] only) 12/16/19 1156 Right anterior ankle surgical (Active)   Length (cm) 2 1/13/2020 11:00 AM   Width (cm) 3.5 1/13/2020 11:00 AM   Depth (cm) 0.4 1/13/2020 11:00 AM   Wound (cm^2) 7 cm^2 1/13/2020 11:00 AM   Wound Volume (cm^3) 2.8 cm^3 1/13/2020 11:00 AM   Wound healing % 41.67 1/13/2020 11:00 AM   Dressing Appearance moist drainage 1/13/2020 11:00 AM   Drainage Characteristics/Odor serosanguineous 1/13/2020 11:00 AM   Drainage Amount copious 1/13/2020 11:00 AM   Thickness/Stage full thickness 1/13/2020 11:51 AM   Base red/granulating;slough 1/13/2020 11:51 AM   Periwound intact;edematous 1/13/2020 11:51 AM   Periwound Temperature warm 1/13/2020 11:51 AM   Periwound Skin Turgor soft 1/13/2020 11:47 AM   Care, Wound debrided 1/13/2020 11:51 AM       Alert and appropriate.  Very comfortable.  Ulceration measures 2 x 3.5 x 0.4 cm with a thick layer of fibrin.  Edges are well-defined with no erythema, undermining, edema.  Surrounding skin is still somewhat irritated.    Excellent pulses.    Procedure:   Patient was determined to be capable of making  their own medical decisions and informed consent was obtained. Topical anesthetic of 4% lidocaine was applied, debridement was performed using a #15 blade down to and including subcutaneous tissue.  All slough and fibrin removed and the skin edges slightly debrided.  Excellent granulation tissue fills the entire base of the wound including the deeper area that we had mentioned earlier with her visits.  Excellent bleeding is noted this stopped with simple pressure .     Impression: Continued improvement.  Excellent granulation tissue following debridement.  Would not recommend that she change the endoform daily.  Due to the surrounding skin irritation she will place either Adaptic or Telfa over the endoform followed by a Stu to avoid the tape.  With the significant formation of the slough and fibrin recommend weekly debridement in the clinic.    Plan: We will dress the wounds with endoform daily.    Patient will return to the clinic in 1 weeks time    Jorge Gama MD on 1/13/2020 at 12:00 PM

## 2020-01-13 NOTE — DISCHARGE INSTRUCTIONS
[facility] WOUND [facility]ALING [facility]  [address] [id number] [address]    Call us at [phone] if you have any questions about your wounds, have redness or  swelling around your wound, have a fever of 101 or greater or if you have any other  problems or concerns. We answer the phone Monday through Friday 8 am to 4 pm,  please leave a message as we check the voicemail frequently throughout the day.    Yung Dempsey 1990    Collagen Wound Dressing Change to right anterior ankle  Cleanse wound with soap and water in the shower  Cover wound with Endoform cut to size of wound (this will dissolve into the wound)  Cover wound with adaptic or Telfa then roll gauze  Change dressing daily    Compression: Apply clean compression Spandagrip E first thing in the morning and  remove at night before going to bed.  Remove compression dressing if toes turn blue and/or start to feel numb and is not  relieved by elevating the leg for one hour.    Jorge Gama M.D. January 13, 2020    Wound Healing Clinic Follow up with nurse visit next Monday 1- and Dr. Gama on 1- [phone]

## 2020-01-20 ENCOUNTER — HOSPITAL ENCOUNTER (OUTPATIENT)
Dept: WOUND CARE | Facility: CLINIC | Age: 30
Discharge: HOME | End: 2020-01-20
Attending: SURGERY | Admitting: SURGERY
Payer: COMMERCIAL

## 2020-01-20 VITALS — SYSTOLIC BLOOD PRESSURE: 107 MMHG | RESPIRATION RATE: 18 BRPM | DIASTOLIC BLOOD PRESSURE: 64 MMHG | TEMPERATURE: 97.7 F

## 2020-01-20 DIAGNOSIS — S91.001A ANKLE WOUND, RIGHT, INITIAL ENCOUNTER: ICD-10-CM

## 2020-01-20 PROCEDURE — 97597 DBRDMT OPN WND 1ST 20 CM/<: CPT

## 2020-01-20 PROCEDURE — A6021 COLLAGEN DRESSING <=16 SQ IN: HCPCS

## 2020-01-27 ENCOUNTER — HOSPITAL ENCOUNTER (OUTPATIENT)
Dept: WOUND CARE | Facility: CLINIC | Age: 30
Discharge: HOME | End: 2020-01-27
Attending: SURGERY | Admitting: SURGERY
Payer: COMMERCIAL

## 2020-01-27 VITALS
SYSTOLIC BLOOD PRESSURE: 106 MMHG | DIASTOLIC BLOOD PRESSURE: 73 MMHG | HEART RATE: 63 BPM | RESPIRATION RATE: 18 BRPM | TEMPERATURE: 97.3 F

## 2020-01-27 DIAGNOSIS — S91.001A ANKLE WOUND, RIGHT, INITIAL ENCOUNTER: ICD-10-CM

## 2020-01-27 PROCEDURE — A6021 COLLAGEN DRESSING <=16 SQ IN: HCPCS

## 2020-01-27 PROCEDURE — 11042 DBRDMT SUBQ TIS 1ST 20SQCM/<: CPT | Performed by: SURGERY

## 2020-01-27 PROCEDURE — 11042 DBRDMT SUBQ TIS 1ST 20SQCM/<: CPT

## 2020-01-27 NOTE — ADDENDUM NOTE
Encounter addended by: Fiorella Echeverria RN on: 1/27/2020 12:20 PM   Actions taken: Flowsheet accepted, Visit Navigator Flowsheet section accepted, Charge Capture section accepted

## 2020-01-27 NOTE — DISCHARGE INSTRUCTIONS
[facility] WOUND [facility]ALING [facility]  [address] [address]    Call us at [phone] if you have any questions about your wounds, have redness or  swelling around your wound, have a fever of 101 or greater or if you have any other  problems or concerns. We answer the phone Monday through Friday 8 am to 4 pm,  please leave a message as we check the voicemail frequently throughout the day.    Yung Dempsey 1990    Collagen Wound Dressing Change to right anterior ankle  Cleanse wound with soap and water in the shower  Cover wound with Endoform cut to size of wound (this will dissolve into the wound)  Cover wound with adaptic or Telfa then roll gauze  Change dressing daily    Compression: Apply clean compression Spandagrip E first thing in the morning and  remove at night before going to bed.  Remove compression dressing if toes turn blue and/or start to feel numb and is not  relieved by elevating the leg for one hour.    Jorge Gama M.D. January 27, 2020    Wound Healing Clinic Follow up next week

## 2020-01-27 NOTE — ADDENDUM NOTE
Encounter addended by: Jorge Gama MD on: 1/27/2020 12:18 PM   Actions taken: Charge Capture section accepted

## 2020-01-27 NOTE — PROGRESS NOTES
[facility] Wound Healing [place] Progress Note    Subject: Yung Dempsey turns for follow-up of her right anterior ankle ulcer.  This developed after requiring ORIF for a significant ankle fracture.  Bone is healed well.    We had debrided the ulcer and treated initially with a negative pressure wound VAC.  We then were treating her with endoform.  She is noticed a significant decrease in the size of the ulcer.  She is using the endoform on a daily basis.    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  She discontinued her cam walking boot.  She is doing some physical therapy to improve her range of motion of her ankle.  No pain at all at the site.  Good nutrition including supplements.    Patient Active Problem List   Diagnosis     Ankle wound, right, initial encounter     No past medical history on file.  Exam:  /73 (BP Location: Left arm)   Pulse 63   Temp 97.3  F (36.3  C) (Temporal)   Resp 18   Wound (used by OP [facility] only) 12/16/19 1156 Right anterior ankle surgical (Active)   Length (cm) 1 1/27/2020 12:00 PM   Width (cm) 2 1/27/2020 12:00 PM   Depth (cm) 0.2 1/27/2020 12:00 PM   Wound (cm^2) 2 cm^2 1/27/2020 12:00 PM   Wound Volume (cm^3) 0.4 cm^3 1/27/2020 12:00 PM   Wound healing % 83.33 1/27/2020 12:00 PM   Dressing Appearance moist drainage 1/27/2020 12:00 PM   Drainage Characteristics/Odor serosanguineous 1/27/2020 12:00 PM   Drainage Amount moderate 1/27/2020 12:00 PM     Alert and appropriate.  Very comfortable.  Periwound skin looks excellent with no irritation.  Ulceration has significantly decreased in size.  This now measures 1 x 2 cm with a depth of 0.2 cm.  Significant amount of slough is noted over the surface of the ulcer.  Remaining ulcer was the very deep segment that we had appreciated initially.  Granulation tissue is very robust throughout the entire base of the wound under the fibrin.  Very mild ankle edema is noted.  +3 palpable dorsalis pedis  pulse.    Procedure:   Patient was determined to be capable of making their own medical decisions and informed consent was obtained. Topical anesthetic of 4% lidocaine was applied, debridement was performed using a #15 blade down to and including subcutaneous tissue.  Thick layer of fibrin/slough was excised along with the skin edges.  Excellent granulation tissue with good vascularity was noted throughout the entire site.  Bleeding controlled with light pressure. Patient tolerated procedure well.    Impression: Continued improvement.  Decreasing size of ulcer.  Very healthy granulation tissue.  We will continue with the endoform daily.  With the significant fibrin development that she noticed over the weekend --weekly debridement will be required.  We will see her again next week.    Plan: We will dress the wounds with endoform-Adaptic-Stu daily.  Patient will return to the clinic in 1 weeks time    Jorge Gama MD on 1/27/2020 at 12:07 PM

## 2020-02-04 ENCOUNTER — HOSPITAL ENCOUNTER (OUTPATIENT)
Dept: WOUND CARE | Facility: CLINIC | Age: 30
Discharge: HOME | End: 2020-02-04
Attending: PHYSICIAN ASSISTANT | Admitting: PHYSICIAN ASSISTANT
Payer: COMMERCIAL

## 2020-02-04 VITALS
TEMPERATURE: 97.5 F | DIASTOLIC BLOOD PRESSURE: 71 MMHG | HEART RATE: 72 BPM | SYSTOLIC BLOOD PRESSURE: 103 MMHG | RESPIRATION RATE: 16 BRPM

## 2020-02-04 DIAGNOSIS — S91.001A ANKLE WOUND, RIGHT, INITIAL ENCOUNTER: ICD-10-CM

## 2020-02-04 PROCEDURE — 11042 DBRDMT SUBQ TIS 1ST 20SQCM/<: CPT

## 2020-02-04 PROCEDURE — A6021 COLLAGEN DRESSING <=16 SQ IN: HCPCS

## 2020-02-04 PROCEDURE — 11042 DBRDMT SUBQ TIS 1ST 20SQCM/<: CPT | Performed by: PHYSICIAN ASSISTANT

## 2020-02-04 NOTE — DISCHARGE INSTRUCTIONS
[facility]  [address] [facility] [address] [address]    Call us at [phone] if you have any questions about your wounds, have redness or  swelling around your wound, have a fever of 101 or greater or if you have any other  problems or concerns. We answer the phone Monday through Friday 8 am to 4 pm,  please leave a message as we check the voicemail frequently throughout the day.    Yung Dempsey 1990    Collagen Wound Dressing Change to right anterior ankle  Cleanse wound with soap and water in the shower, apply Critic-Aid to periwound skin  Cover wound with Endoform cut to size of wound (0.7x0.9x0.2) and cover with gauze or Bandaid  Change dressing daily    Compression: Apply clean compression Spandagrip E first thing in the morning and  remove at night before going to bed.  Remove compression dressing if toes turn blue and/or start to feel numb and is not  relieved by elevating the leg for one hour.    Divine Cheema PA-C, February 4, 2020    Wound Healing Clinic Follow up next week

## 2020-02-04 NOTE — PROGRESS NOTES
[place] [facility]    HISTORY OF PRESENT ILLNESS:   Yung Dempsey is a 29 year old female who returns for a right ankle ulcer.  She developed this after an ORIF. Has been followed by Dr. Gama regularly. Initially she was using NPWT and has now transitioned to endoform daily. Wound is nearly healed today with very little slough or bioburden.     PAST MEDICAL HISTORY: no significant medical history    SOCIAL HISTORY: RN at [employer]  TOBACCO STATUS:  reports that she has never smoked. She has never used smokeless tobacco.    MEDICATIONS:   Current Outpatient Medications   Medication     acetaminophen (TYLENOL) 500 MG tablet     ibuprofen (ADVIL/MOTRIN) 200 MG tablet     levonorgestrel (MIRENA) 20 MCG/24HR IUD     order for DME     No current facility-administered medications for this encounter.      VITALS: /71 (BP Location: Right arm)   Pulse 72   Temp 97.5  F (36.4  C) (Temporal)   Resp 16     PHYSICAL EXAM:  GENERAL: Patient is alert and oriented and in no acute distress  INTEGUMENTARY:   Wound (used by OP [facility] only) 12/16/19 1156 Right anterior ankle surgical (Active)   Length (cm) 0.7 2/4/2020  2:00 PM   Width (cm) 0.9 2/4/2020  2:00 PM   Depth (cm) 0.2 2/4/2020  2:00 PM   Wound (cm^2) 0.63 cm^2 2/4/2020  2:00 PM   Wound Volume (cm^3) 0.13 cm^3 2/4/2020  2:00 PM   Wound healing % 94.75 2/4/2020  2:00 PM   Dressing Appearance moist drainage 2/4/2020  2:00 PM   Drainage Characteristics/Odor serosanguineous 2/4/2020  2:00 PM   Drainage Amount moderate 2/4/2020  2:00 PM   Base red/granulating 2/4/2020  3:11 PM   Periwound intact;excoriated 2/4/2020  3:11 PM   Periwound Temperature warm 2/4/2020  3:11 PM   Care, Wound debrided 2/4/2020  3:11 PM       PROCEDURE: 4% topical lidocaine was applied to the wound by nursing staff. Patient was determined to be capable of making their own medical decisions and informed consent was obtained. Using a sharp curette a surgical debridement was performed down to  and including subcutaneous tissue of <20 cm2. Hemostasis was achieved with pressure. The patient tolerated the procedure well.      ASSESSMENT:   1. Full-thickness surgical ulcer of right ankle with fat-layer exposed  2. Adherence to plan of care - unknown as this is initial assessment    PLAN/DISCUSSION:   1. Wound care plan: continue with endoform, will add barrier cream to periwound, cover with bandaid or gauze    FOLLOW-UP: 1 week    SHEILA GARDUNO PA-C

## 2020-02-10 ENCOUNTER — HOSPITAL ENCOUNTER (OUTPATIENT)
Dept: WOUND CARE | Facility: CLINIC | Age: 30
Discharge: HOME | End: 2020-02-10
Attending: SURGERY | Admitting: SURGERY
Payer: COMMERCIAL

## 2020-02-10 VITALS
SYSTOLIC BLOOD PRESSURE: 110 MMHG | TEMPERATURE: 97.2 F | DIASTOLIC BLOOD PRESSURE: 75 MMHG | HEART RATE: 74 BPM | RESPIRATION RATE: 19 BRPM

## 2020-02-10 DIAGNOSIS — S91.001A ANKLE WOUND, RIGHT, INITIAL ENCOUNTER: ICD-10-CM

## 2020-02-10 PROCEDURE — A6248 HYDROGEL DRSG GEL FILLER: HCPCS

## 2020-02-10 PROCEDURE — 97597 DBRDMT OPN WND 1ST 20 CM/<: CPT | Performed by: SURGERY

## 2020-02-10 PROCEDURE — A6212 FOAM DRG <=16 SQ IN W/BORDER: HCPCS

## 2020-02-10 PROCEDURE — 11042 DBRDMT SUBQ TIS 1ST 20SQCM/<: CPT

## 2020-02-10 PROCEDURE — 97597 DBRDMT OPN WND 1ST 20 CM/<: CPT

## 2020-02-10 NOTE — PROGRESS NOTES
[facility] Wound Healing [place] Progress Note    Subject: Yung Dempsey returns for follow-up of her right anterior ankle ulcer initially related to her ankle fracture requiring ORIF.  This was continued to improve.  She is presently using endoform but due to the decreasing size this does not stay in place even if she wraps this with a gauze.    Her ankle fracture is completely healed and she is full weightbearing and ambulating with no difficulty.  Very mild swelling is noted.  No pain at all.    Patient Active Problem List   Diagnosis     Ankle wound, right, initial encounter     No past medical history on file.  Exam:  /75 (BP Location: Left arm)   Pulse 74   Temp 97.2  F (36.2  C) (Temporal)   Resp 19      Alert appropriate.  Very comfortable.  Small amount of slough is noted over the remaining ulcerated area  This measures 0.3 x 0.3 x 0.2 cm.  Healthy granulation tissue is noted in the base with no undermining.    Procedure:   Patient was determined to be capable of making their own medical decisions and informed consent was obtained. Topical anesthetic of 4% lidocaine was applied, debridement was performed using a #15 blade a selective debridement was performed remove the overlying slough and slightly debrided the base.  Healthy firm bleeding granulation tissue is noted.  No undermining.  Bleeding controlled with light pressure. Patient tolerated procedure well.    Impression: Continued improvement with decreasing size.  Very healthy tissue following debridement.  Will switch over to a Woun'Dres collagen gel and overlying Mepilex foam adhesive that she will change in a daily basis.    Plan: We will dress the wounds with Woun'Dres collagen gel daily.  Patient will return to the clinic in 2 weeks time    Jorge Gama MD on 2/10/2020 at 9:32 AM

## 2020-02-24 ENCOUNTER — HOSPITAL ENCOUNTER (OUTPATIENT)
Dept: WOUND CARE | Facility: CLINIC | Age: 30
Discharge: HOME | End: 2020-02-24
Attending: SURGERY | Admitting: SURGERY
Payer: COMMERCIAL

## 2020-02-24 VITALS — TEMPERATURE: 97.2 F | SYSTOLIC BLOOD PRESSURE: 108 MMHG | HEART RATE: 65 BPM | DIASTOLIC BLOOD PRESSURE: 69 MMHG

## 2020-02-24 DIAGNOSIS — S91.001A ANKLE WOUND, RIGHT, INITIAL ENCOUNTER: ICD-10-CM

## 2020-02-24 PROCEDURE — 99212 OFFICE O/P EST SF 10 MIN: CPT | Performed by: SURGERY

## 2020-02-24 PROCEDURE — G0463 HOSPITAL OUTPT CLINIC VISIT: HCPCS

## 2020-02-24 NOTE — PROGRESS NOTES
[facility] Wound Healing [place] Progress Note    Subject: Yung Dempsey had a significant right distal tibia/fibular fracture of her ankle requiring ORIF.  She developed significant post procedure swelling developed an ulceration over her anterior ankle crease.  No evidence of infection.  Good healing related to her orthopedic issues.  We treated her initially with wound debridement and a wound VAC and she responded extremely well.  On her last visit on 2/10/2020 she had a small ulcer remaining that measured 0.3 x 0.3 x 0.2 cm.  Very healthy granulation tissue was noted.  She continued with Woun'Dres collagen gel to the site.    Over the last week she is noted a small scab at a little over the area.  No longer using any dressings particularly the wrap which seem to be more bothersome.    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.    Patient Active Problem List   Diagnosis     Ankle wound, right, initial encounter     No past medical history on file.  Exam:  /69   Pulse 65   Temp 97.2  F (36.2  C) (Temporal)      Alert and appropriate.  Very comfortable.  Very mild ankle edema related to her injury.  Excellent blood supply.  Very small scab is noted over the previous ulcerated site.    Procedure:   Patient was determined to be capable of making their own medical decisions and informed consent was obtained. Topical anesthetic of 4% lidocaine was applied, debridement was performed using a #15 blade to remove the scab with essentially healed base noted (no charge)    Impression: Essentially healed ulcer.  Band-Aid applied over today but is not necessary after tomorrow.    Plan: We will dress the wounds with Band-Aid as needed.  Patient will return to the clinic if another scab develops over the site that she desires to have removed otherwise no specific follow-up is indicated.  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      Jorge Gama MD on 2/24/2020 at 9:29 AM

## 2020-02-24 NOTE — DISCHARGE INSTRUCTIONS
[facility]  [address], [facility] [address]  Appointment Phone [phone]     Yugn Dempsey      1990    Your wound is healed!! Dressings are no longer required.      Jorge Gama M.D.. February 24, 2020    Call us at [phone] if you have any questions about your wounds, have redness or swelling around your wound, have a fever of 101 or greater or if you have any other problems or concerns. We answer the phone Monday through Friday 8 am to 4 pm, please leave a message as we check the voicemail frequently throughout the day.     Follow up with our office as needed

## 2020-11-08 ENCOUNTER — VIRTUAL VISIT (OUTPATIENT)
Dept: FAMILY MEDICINE | Facility: OTHER | Age: 30
End: 2020-11-08

## 2020-11-08 NOTE — PROGRESS NOTES
"Date: 2020 09:59:05  Clinician: Asaf Hahn  Clinician NPI: [id number]  Patient: Yung Dempsey  Patient : 1990  Patient Address: [address] [place] [address]  Patient Phone: [phone]  Visit Protocol: URI  Patient Summary:  Yung is a 30 year old ( : 1990 ) female who initiated a [facility] Visit for COVID-19 (Coronavirus) evaluation and screening. When asked the question \"Please sign me up to receive news, health information and promotions. \", Yung responded \"No\".    Yung states her symptoms started gradually 3-4 days ago.   Her symptoms consist of facial pain or pressure, malaise, a headache, nasal congestion, and anosmia.   Symptom details     Nasal secretions: The color of her mucus is clear.    Facial pain or pressure: The facial pain or pressure feels worse when bending over or leaning forward.     Headache: She states the headache is mild (1-3 on a 10 point pain scale).      Yung denies having vomiting, rhinitis, myalgias, chills, sore throat, teeth pain, ageusia, diarrhea, ear pain, wheezing, fever, cough, and nausea. She also denies taking antibiotic medication in the past month, having recent facial or sinus surgery in the past 60 days, and double sickening (worsening symptoms after initial improvement). She is not experiencing dyspnea.    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 In the past 14 days, Yung has worked or volunteered at a hospital or a clinic. 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   Yung has had a close contact with a laboratory-confirmed COVID-19 patient within 14 days of symptom onset. She was exposed at her work. She does not know when she was exposed to the laboratory-confirmed COVID-19 patient.   " Additional information about contact with COVID-19 (Coronavirus) patient as reported by the patient (free text): At work. Wearing masks    Since December 2019, Yung has been tested for COVID-19 and has not had upper respiratory infection or influenza-like illness.      Result of COVID-19 test: Negative     Date of her COVID-19 test: 08/15/2020      Pertinent medical history  Yung does not get yeast infections when she takes antibiotics.   uYng does not need a return to work/school note.   Weight: 150 lbs   Yung does not smoke or use smokeless tobacco.   She denies pregnancy and denies breastfeeding. She does not menstruate.   Weight: 150 lbs    MEDICATIONS: No current medications, ALLERGIES: NKDA  Clinician Response:  Dear Yung,   Your symptoms show that you may have coronavirus (COVID-19). This illness can cause fever, cough and trouble breathing. Many people get a mild case and get better on their own. Some people can get very sick.  What should I do?  We would like to test you for this virus.   1. Please call [phone] to schedule your visit. Explain that you were referred by [facility] to have a COVID-19 test. [facility].  * If you need to schedule in [facility] please call [phone] or for [employer] employees please call [phone].  * If you need to schedule in the [place] area please call [phone]. [employer] employees call [phone].  The following will serve as your written order for this COVID Test, ordered by me, for the indication of suspected COVID [Z20.828]: The test will be ordered in [facility], our electronic health record, after you are scheduled. It will show as ordered and authorized by Edmund Robbins MD.  Order: COVID-19 (Coronavirus) PCR for SYMPTOMATIC testing from [facility].   2. When it's time for your COVID test:  Stay at least 6 feet away from others. (If someone will drive you to your test, stay in the backseat, as far away from the  as  "you can.)   Cover your mouth and nose with a mask, tissue or washcloth.  Go straight to the testing site. Don't make any stops on the way there or back.      3.Starting now: Stay home and away from others (self-isolate) until:   You've had no fever---and no medicine that reduces fever---for one full day (24 hours). And...   Your other symptoms have gotten better. For example, your cough or breathing has improved. And...   At least 10 days have passed since your symptoms started.       During this time, don't leave the house except for testing or medical care.   Stay in your own room, even for meals. Use your own bathroom if you can.   Stay away from others in your home. No hugging, kissing or shaking hands. No visitors.  Don't go to work, school or anywhere else.    Clean \"high touch\" surfaces often (doorknobs, counters, handles, etc.). Use a household cleaning spray or wipes. You'll find a full list of  on the EPA website: www.epa.gov/pesticide-registration/list-n-disinfectants-use-against-sars-cov-2.   Cover your mouth and nose with a mask, tissue or washcloth to avoid spreading germs.  Wash your hands and face often. Use soap and water.  Caregivers in these groups are at risk for severe illness due to COVID-19:  o People 65 years and older  o People who live in a nursing home or long-term care facility  o People with chronic disease (lung, heart, cancer, diabetes, kidney, liver, immunologic)  o People who have a weakened immune system, including those who:   Are in cancer treatment  Take medicine that weakens the immune system, such as corticosteroids  Had a bone marrow or organ transplant  Have an immune deficiency  Have poorly controlled HIV or AIDS  Are obese (body mass index of 40 or higher)  Smoke regularly   o Caregivers should wear gloves while washing dishes, handling laundry and cleaning bedrooms and bathrooms.  o Use caution when washing and drying laundry: Don't shake dirty laundry, and use the " warmest water setting that you can.  o For more tips, go to www.cdc.gov/coronavirus/2019-ncov/downloads/10Things.pdf.    4.Sign up for [employer]. We know it's scary to hear that you might have COVID-19. We want to track your symptoms to make sure you're okay over the next 2 weeks. Please look for an email from [employer]---this is a free, online program that we'll use to keep in touch. To sign up, follow the link in the email. Learn more at [email or web address]  How can I take care of myself?   Get lots of rest. Drink extra fluids (unless a doctor has told you not to).   Take Tylenol (acetaminophen) for fever or pain. If you have liver or kidney problems, ask your family doctor if it's okay to take Tylenol.   Adults can take either:    650 mg (two 325 mg pills) every 4 to 6 hours, or...   1,000 mg (two 500 mg pills) every 8 hours as needed.    Note: Don't take more than 3,000 mg in one day. Acetaminophen is found in many medicines (both prescribed and over-the-counter medicines). Read all labels to be sure you don't take too much.   For children, check the Tylenol bottle for the right dose. The dose is based on the child's age or weight.    If you have other health problems (like cancer, heart failure, an organ transplant or severe kidney disease): Call your specialty clinic if you don't feel better in the next 2 days.       Know when to call 911. Emergency warning signs include:    Trouble breathing or shortness of breath Pain or pressure in the chest that doesn't go away Feeling confused like you haven't felt before, or not being able to wake up Bluish-colored lips or face.  Where can I get more information?    [facility] -- About COVID-19: [email or web address]   CDC -- What to Do If You're Sick: www.cdc.gov/coronavirus/2019-ncov/about/steps-when-sick.html   CDC -- Ending Home Isolation: www.cdc.gov/coronavirus/2019-ncov/hcp/disposition-in-home-patients.html   CDC -- Caring for  Someone: www.cdc.gov/coronavirus/2019-ncov/if-you-are-sick/care-for-someone.html   [facility] -- Interim Guidance for Hospital Discharge to Home: www.health.[facility].[place]/diseases/coronavirus/hcp/hospdischarge.pdf   [facility] clinical trials (COVID-19 research studies): clinicalaffairs.[facility]-clinical-trials    Below are the COVID-19 hotlines at the [place] Department of Health ([facility]). Interpreters are available.    For health questions: Call 948-611-6851 or 1-146.744.1610 (7 a.m. to 7 p.m.) For questions about schools and childcare: Call 839-924-2803 or 1-869.754.2098 (7 a.m. to 7 p.m.)    Diagnosis: Contact with and (suspected) exposure to other viral communicable diseases  Diagnosis ICD: Z20.828

## 2020-11-11 DIAGNOSIS — Z20.822 SUSPECTED COVID-19 VIRUS INFECTION: Primary | ICD-10-CM

## 2020-12-14 ENCOUNTER — RX ONLY (RX ONLY)
Age: 30
End: 2020-12-14

## 2020-12-14 RX ORDER — TRIAMCINOLONE ACETONIDE 1 MG/G
CREAM TOPICAL
Qty: 80 | Refills: 1 | Status: ERX | COMMUNITY
Start: 2020-12-14

## 2021-01-29 ENCOUNTER — AMBULATORY - HEALTHEAST (OUTPATIENT)
Dept: NURSING | Facility: CLINIC | Age: 31
End: 2021-01-29

## 2021-02-05 ENCOUNTER — APPOINTMENT (OUTPATIENT)
Dept: URBAN - METROPOLITAN AREA CLINIC 255 | Age: 31
Setting detail: DERMATOLOGY
End: 2021-02-05

## 2021-02-05 DIAGNOSIS — L91.0 HYPERTROPHIC SCAR: ICD-10-CM

## 2021-02-05 DIAGNOSIS — L90.5 SCAR CONDITIONS AND FIBROSIS OF SKIN: ICD-10-CM

## 2021-02-05 PROCEDURE — 96405 CHEMO INTRALESIONAL UP TO 7: CPT

## 2021-02-05 PROCEDURE — OTHER SCAR INJECTION: OTHER

## 2021-02-05 PROCEDURE — OTHER MIPS QUALITY: OTHER

## 2021-02-05 PROCEDURE — OTHER PULSED-DYE LASER: OTHER

## 2021-02-05 PROCEDURE — 17110 DESTRUCT B9 LESION 1-14: CPT

## 2021-02-05 PROCEDURE — OTHER COUNSELING: OTHER

## 2021-02-05 ASSESSMENT — LOCATION DETAILED DESCRIPTION DERM
LOCATION DETAILED: LEFT INFERIOR CENTRAL BUCCAL CHEEK
LOCATION DETAILED: RIGHT ANKLE
LOCATION DETAILED: RIGHT UPPER CUTANEOUS LIP
LOCATION DETAILED: RIGHT MEDIAL DISTAL PRETIBIAL REGION
LOCATION DETAILED: RIGHT CENTRAL BUCCAL CHEEK

## 2021-02-05 ASSESSMENT — LOCATION SIMPLE DESCRIPTION DERM
LOCATION SIMPLE: LEFT CHEEK
LOCATION SIMPLE: RIGHT ANKLE
LOCATION SIMPLE: RIGHT PRETIBIAL REGION
LOCATION SIMPLE: RIGHT LIP
LOCATION SIMPLE: RIGHT CHEEK

## 2021-02-05 ASSESSMENT — LOCATION ZONE DERM
LOCATION ZONE: LIP
LOCATION ZONE: FACE
LOCATION ZONE: LEG

## 2021-02-05 NOTE — PROCEDURE: PULSED-DYE LASER
Delay Time (Ms): 20
Pulse Duration: .45 ms
Post-Care Instructions: I reviewed with the patient in detail post-care instructions. Patient should stay away from the sun and wear sun protection until treated areas are fully healed.
Spot Size: 7 mm
Cryogen Time (Ms): 0
Immediate Endpoint: erythema
Post-Procedure Care: Vaseline and ice applied. Post care reviewed with patient.
Cryogen Time (Ms): 30
Pulse Duration: 10 ms
Consent: Written consent obtained, risks reviewed including but not limited to crusting, scabbing, blistering, scarring, darker or lighter pigmentary change, incidental hair removal, bruising, and/or incomplete removal.
Spot Size: 10 mm
Immediate Endpoint: purpura
Pulse Count (Optional): 4
Laser Type: Vbeam 595nm
Detail Level: Detailed
Pulse Duration: 3 ms
Fluence In J/Cm2 (Optional): 8
Delay Time (Ms): 10

## 2021-02-05 NOTE — PROCEDURE: SCAR INJECTION
Administered By (Optional): Dr. Angélica Pandya MD Administered By (Optional): Dr. Angléica Pandya MD

## 2021-02-05 NOTE — PROCEDURE: MIPS QUALITY
Quality 110: Preventive Care And Screening: Influenza Immunization: Influenza Immunization Administered during Influenza season
Quality 431: Preventive Care And Screening: Unhealthy Alcohol Use - Screening: Patient screened for unhealthy alcohol use using a single question and scores 2 or greater episodes per year and brief intervention occurred
Detail Level: Detailed
Quality 226: Preventive Care And Screening: Tobacco Use: Screening And Cessation Intervention: Patient screened for tobacco use and is an ex/non-smoker
Quality 130: Documentation Of Current Medications In The Medical Record: Current Medications Documented

## 2021-02-15 ENCOUNTER — APPOINTMENT (OUTPATIENT)
Dept: URBAN - METROPOLITAN AREA CLINIC 255 | Age: 31
Setting detail: DERMATOLOGY
End: 2021-02-15

## 2021-02-15 DIAGNOSIS — L24 IRRITANT CONTACT DERMATITIS: ICD-10-CM

## 2021-02-15 PROBLEM — L24.9 IRRITANT CONTACT DERMATITIS, UNSPECIFIED CAUSE: Status: ACTIVE | Noted: 2021-02-15

## 2021-02-15 PROCEDURE — OTHER PRESCRIPTION: OTHER

## 2021-02-15 RX ORDER — HYDROCORTISONE 25 MG/G
CREAM TOPICAL BID
Qty: 30 | Refills: 1 | Status: ERX | COMMUNITY
Start: 2021-02-15

## 2021-02-19 ENCOUNTER — AMBULATORY - HEALTHEAST (OUTPATIENT)
Dept: NURSING | Facility: CLINIC | Age: 31
End: 2021-02-19

## 2021-03-07 ENCOUNTER — HEALTH MAINTENANCE LETTER (OUTPATIENT)
Age: 31
End: 2021-03-07

## 2021-03-22 ENCOUNTER — APPOINTMENT (OUTPATIENT)
Dept: URBAN - METROPOLITAN AREA CLINIC 255 | Age: 31
Setting detail: DERMATOLOGY
End: 2021-03-23

## 2021-03-22 DIAGNOSIS — L91.0 HYPERTROPHIC SCAR: ICD-10-CM

## 2021-03-22 PROCEDURE — OTHER SCAR INJECTION: OTHER

## 2021-03-22 PROCEDURE — OTHER PULSED-DYE LASER: OTHER

## 2021-03-22 PROCEDURE — 11900 INJECT SKIN LESIONS </W 7: CPT

## 2021-03-22 PROCEDURE — OTHER MIPS QUALITY: OTHER

## 2021-03-22 PROCEDURE — OTHER COUNSELING: OTHER

## 2021-03-22 PROCEDURE — 17110 DESTRUCT B9 LESION 1-14: CPT

## 2021-03-22 ASSESSMENT — LOCATION SIMPLE DESCRIPTION DERM
LOCATION SIMPLE: RIGHT ANKLE
LOCATION SIMPLE: RIGHT PRETIBIAL REGION

## 2021-03-22 ASSESSMENT — LOCATION ZONE DERM: LOCATION ZONE: LEG

## 2021-03-22 ASSESSMENT — LOCATION DETAILED DESCRIPTION DERM
LOCATION DETAILED: RIGHT MEDIAL DISTAL PRETIBIAL REGION
LOCATION DETAILED: RIGHT ANKLE

## 2021-08-18 ENCOUNTER — RX ONLY (RX ONLY)
Age: 31
End: 2021-08-18

## 2021-08-18 RX ORDER — NITROFURANTOIN MONOHYDRATE/MACROCRYSTALLINE 25; 75 MG/1; MG/1
CAPSULE ORAL
Qty: 10 | Refills: 0 | Status: ERX | COMMUNITY
Start: 2021-08-18

## 2021-10-10 ENCOUNTER — HEALTH MAINTENANCE LETTER (OUTPATIENT)
Age: 31
End: 2021-10-10

## 2022-03-27 ENCOUNTER — HEALTH MAINTENANCE LETTER (OUTPATIENT)
Age: 32
End: 2022-03-27

## 2022-09-18 ENCOUNTER — HEALTH MAINTENANCE LETTER (OUTPATIENT)
Age: 32
End: 2022-09-18

## 2023-01-26 ENCOUNTER — THERAPY VISIT (OUTPATIENT)
Dept: PHYSICAL THERAPY | Facility: CLINIC | Age: 33
End: 2023-01-26
Payer: COMMERCIAL

## 2023-01-26 DIAGNOSIS — M25.571 PAIN IN JOINT INVOLVING ANKLE AND FOOT, RIGHT: ICD-10-CM

## 2023-01-26 PROCEDURE — 97110 THERAPEUTIC EXERCISES: CPT | Mod: GP | Performed by: PHYSICAL THERAPIST

## 2023-01-26 PROCEDURE — 97161 PT EVAL LOW COMPLEX 20 MIN: CPT | Mod: GP | Performed by: PHYSICAL THERAPIST

## 2023-01-26 PROCEDURE — 97530 THERAPEUTIC ACTIVITIES: CPT | Mod: GP | Performed by: PHYSICAL THERAPIST

## 2023-01-28 PROBLEM — M25.571 PAIN IN JOINT INVOLVING ANKLE AND FOOT, RIGHT: Status: ACTIVE | Noted: 2023-01-28
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
## 2023-05-07 ENCOUNTER — HEALTH MAINTENANCE LETTER (OUTPATIENT)
Age: 33
End: 2023-05-07

## 2024-07-14 ENCOUNTER — HEALTH MAINTENANCE LETTER (OUTPATIENT)
Age: 34
End: 2024-07-14

## 2025-07-19 ENCOUNTER — HEALTH MAINTENANCE LETTER (OUTPATIENT)
Age: 35
End: 2025-07-19